# Patient Record
(demographics unavailable — no encounter records)

---

## 2025-02-13 NOTE — ASSESSMENT
[FreeTextEntry1] : 73 year M WITH MODERATE B/L KNEE PAIN, R>L. PAIN WORSENS WITH STAIRS AND WALKING PROLONGED DISTANCES. PAIN IS AFFECTING ADL AND FUNCTIONAL ACTIVITIES. OUTSIDE XRAYS ([facility]) 5/9/24 REVIEWED WITH ADVANCED TRICOMPARTMENTAL OA. TREATMENT OPTIONS REVIEWED. QUESTIONS ANSWERED. RT TKA AGAIN DISCUSSED AT LENGTH.   PMHx: HLD,  NO METAL ALLERGIES NO H/O DM NO H/O DVT/PE NO H/O MRSA/VRSA  RT TKA - SCHEDULED 4/2/25.  DISCUSSED R&B OF TKA. WILL ORDER CT TO EVAL FOR BONE LOSS AND DEFORMITY FOR PRE-OP PLANNING.   The patient was advised of the diagnosis. The natural history of the pathology was explained in full to the patient in layman's terms. All questions were answered. The risks and benefits of surgical and non-surgical treatment alternatives were explained in full to the patient.   We discussed my findings and the natural history of their condition. We talked about the details of the proposed surgery and the recovery. We discussed the material risks, possible benefits and alternatives to surgery. The risks include but are not limited to infection, bleeding and possible need for blood transfusion, fracture, bowel blockage, bladder retention or infection, need for reoperation, stiffness and/or limited range of motion, possible damage to nerves and blood vessels, failure of fixation of components, risk of deep vein thromboses and pulmonary embolism, wound healing problems. Although incredibly rare, we also discussed the risks of a cardiac event, stroke and even death during, or following, the surgery. We discussed the type of implants the patient will be receiving and the type of fixation that will be used, as well as whether a robot or computer navigation aide will be used. The patient understands they will need medical clearance and will attend a preoperative joint education class. We also discussed the type of anesthesia they will receive, and the risks associated with hospital or rehab length of stay, obesity, diabetes and smoking.   Patient Complains of pain in the affected joint with a level that often reaches greater than a 8/10. The pain has been progressively worsening throughout his/her treatment course. The pain has interfered with their ADLs and worsens with weight bearing. On exam they often have episodes of swelling/effusion with limited ROM. Pain worsens with ROM passive and active and I can palpate crepitus.   X- rays were reviewed with the patient and they show joint space narrowing, subchondral sclerosis, osteophyte formation, and subchondral cysts. After a period of more than 12 weeks physical therapy or exercise program done with me or another treating physician they have continued pain. The patient has failed a trial of NSAID medication or pain relievers if they were unable to tolerate NSAID medications as well as a series of injections, steroids, or hyaluronic acid. After a long discussion with the patient both the patient and I have decided we have exhausted all forms of less radical treatments and they would like to proceed with Total Joint Replacement.   Patient will plan to schedule surgery. Patient requires rolling walker and 3-in-1 commode S/P surgery. Patient currently as limited mobility that significantly impairs their ability to participate in one or more mobility related activities of daily living in the home. The patient is able to safely use the walker and the functional mobility deficit can be sufficiently resolved with the use of a walker. Patient will also be confined to one level of the home environment and there is no toilet on that level. Requires 3-in-1 commode for bedside use as patient cannot access bathroom safely in their home in timely manner. Will order rolling walker and 3-in-1 commode.

## 2025-02-13 NOTE — HISTORY OF PRESENT ILLNESS
[5] : 5 [Intermittent] : intermittent [Exercising] : exercising [Stairs] : stairs [de-identified] : 11.21.24 PATIENT HERE FOR RIGHT KNEE PAIN. PATIENT STATES PAIN STARTED 10 YEARS AGO, NO INJURY. PATIENT STATES HE HAD 2 SURGERIES DONE ON THAT KNEE. PATIENT STATES HE HAS A PROBLEM WITH MOBILITY.  2.13.25 PATIENT HERE FOR RIGHT KNEE PAIN.

## 2025-02-13 NOTE — DISCUSSION/SUMMARY
[de-identified] : I, Tita Hill, am scribing for Dr. Hernandez in his presence for the chief complaint, physical exam, studies, assessment, and/or plan.

## 2025-02-13 NOTE — PHYSICAL EXAM
[Bilateral] : knee bilaterally [] : no erythema [TWNoteComboBox7] : flexion 135 degrees [de-identified] : extension 0 degrees

## 2025-04-17 NOTE — HISTORY OF PRESENT ILLNESS
[5] : 5 [Intermittent] : intermittent [Exercising] : exercising [Stairs] : stairs [de-identified] : 4.15.25 PATIENT HERE FOR RIGHT KNEE. DOS: 4.2.25

## 2025-04-17 NOTE — HISTORY OF PRESENT ILLNESS
[5] : 5 [Intermittent] : intermittent [Exercising] : exercising [Stairs] : stairs [de-identified] : 4.15.25 PATIENT HERE FOR RIGHT KNEE. DOS: 4.2.25

## 2025-04-17 NOTE — PHYSICAL EXAM
[Right] : right knee [] : no sign of infection [TWNoteComboBox7] : flexion 115 degrees [de-identified] : extension 3 degrees

## 2025-04-17 NOTE — PHYSICAL EXAM
[Right] : right knee [] : no sign of infection [TWNoteComboBox7] : flexion 115 degrees [de-identified] : extension 3 degrees

## 2025-04-17 NOTE — ASSESSMENT
[FreeTextEntry1] : S/P RT TKA 4/2/25: NO F/C/S. DOING WELL. XRAYS REVIEWED WITH COMPONENTS WELL FIXED. NO SIGNS OF INFECTION. GOOD LIGAMENT BALANCE ON EXAM.  DISCUSSED THE IMPORTANCE OF ELEVATION TO REDUCE SWELLING. PROPER ELEVATION TECHNIQUES DISCUSSED. ABX AND PRECAUTIONS REVIEWED. PT RX. QUESTIONS ANSWERED.

## 2025-05-27 NOTE — HISTORY OF PRESENT ILLNESS
[5] : 5 [Intermittent] : intermittent [Exercising] : exercising [Stairs] : stairs [de-identified] : 4.15.25 PATIENT HERE FOR RIGHT KNEE. DOS: 4.2.25  5.27.25 PATIENT HERE FOR RIGHT KNEE. DOS: 4.2.25

## 2025-05-27 NOTE — PHYSICAL EXAM
[Right] : right knee [] : no sign of infection [TWNoteComboBox7] : flexion 115 degrees [de-identified] : extension 3 degrees
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
## 2025-07-08 NOTE — HISTORY OF PRESENT ILLNESS
[0] : 0 [Intermittent] : intermittent [Exercising] : exercising [Stairs] : stairs [de-identified] : 4.15.25 PATIENT HERE FOR RIGHT KNEE. DOS: 4.2.25  5.27.25 PATIENT HERE FOR RIGHT KNEE. DOS: 4.2.25  7.8.25 PATIENT HERE FOR RIGHT KNEE. DOS: 4.2.25